# Patient Record
Sex: FEMALE | Race: WHITE
[De-identification: names, ages, dates, MRNs, and addresses within clinical notes are randomized per-mention and may not be internally consistent; named-entity substitution may affect disease eponyms.]

---

## 2017-04-24 ENCOUNTER — HOSPITAL ENCOUNTER (OUTPATIENT)
Dept: HOSPITAL 45 - C.MAMM | Age: 57
Discharge: HOME | End: 2017-04-24
Attending: FAMILY MEDICINE
Payer: COMMERCIAL

## 2017-04-24 DIAGNOSIS — Z12.31: Primary | ICD-10-CM

## 2017-04-24 NOTE — MAMMOGRAPHY REPORT
BILATERAL DIGITAL SCREENING MAMMOGRAM TOMOSYNTHESIS WITH CAD: 4/24/2017

CLINICAL HISTORY: Routine screening.  Patient has no complaints.  





TECHNIQUE:  Breast tomosynthesis in addition to standard 2D mammography was performed. Current study
 was also evaluated with a Computer Aided Detection (CAD) system.  



COMPARISON: Comparison is made to exams dated:  12/17/2015 mammogram, 11/7/2014 mammogram, 7/9/2013 
mammogram, 6/9/2012 mammogram, 4/2/2011 mammogram, and 1/30/2010 mammogram.   



BREAST COMPOSITION:  The tissue of both breasts is extremely dense, which lowers the sensitivity of 
mammography.  



FINDINGS:  The parenchymal pattern is unchanged. No developing mass, architectural distortion or clu
ster of suspicious microcalcifications is seen in either breast.  





IMPRESSION:  ACR BI-RADS CATEGORY 2: BENIGN

There is no mammographic evidence of malignancy. A 1 year screening mammogram is recommended.  The p
atient will receive written notification of the results.  





Approximately 10% of breast cancers are not detected with mammography. A negative mammographic repor
t should not delay biopsy if a clinically suggestive mass is present.



Aria Gerardo M.D.          

ay/:4/24/2017 16:18:42  



Imaging Technologist: Gisell LI)([id number]), [facility] [address]

letter sent: Normal 1/2  

BI-RADS Code: ACR BI-RADS Category 2: Benign

## 2018-01-16 ENCOUNTER — HOSPITAL ENCOUNTER (OUTPATIENT)
Dept: HOSPITAL 45 - C.LAB | Age: 58
Discharge: HOME | End: 2018-01-16
Attending: NURSE PRACTITIONER
Payer: COMMERCIAL

## 2018-01-16 DIAGNOSIS — Z00.00: Primary | ICD-10-CM

## 2018-01-16 LAB
ALBUMIN SERPL-MCNC: 4.3 GM/DL (ref 3.4–5)
ALP SERPL-CCNC: 74 U/L (ref 45–117)
ALT SERPL-CCNC: 25 U/L (ref 12–78)
AST SERPL-CCNC: 23 U/L (ref 15–37)
BASOPHILS # BLD: 0.04 K/UL (ref 0–0.2)
BASOPHILS NFR BLD: 0.9 %
BUN SERPL-MCNC: 12 MG/DL (ref 7–18)
CALCIUM SERPL-MCNC: 9.3 MG/DL (ref 8.5–10.1)
CO2 SERPL-SCNC: 31 MMOL/L (ref 21–32)
CREAT SERPL-MCNC: 0.61 MG/DL (ref 0.6–1.2)
EOS ABS #: 0.26 K/UL (ref 0–0.5)
EOSINOPHIL NFR BLD AUTO: 225 K/UL (ref 130–400)
GLUCOSE SERPL-MCNC: 88 MG/DL (ref 70–99)
HCT VFR BLD CALC: 40.9 % (ref 37–47)
HGB BLD-MCNC: 13.9 G/DL (ref 12–16)
IG#: 0.01 K/UL (ref 0–0.02)
IMM GRANULOCYTES NFR BLD AUTO: 33.5 %
KETONES UR QL STRIP: 84 MG/DL
LYMPHOCYTES # BLD: 1.56 K/UL (ref 1.2–3.4)
MCH RBC QN AUTO: 31.8 PG (ref 25–34)
MCHC RBC AUTO-ENTMCNC: 34 G/DL (ref 32–36)
MCV RBC AUTO: 93.6 FL (ref 80–100)
MONO ABS #: 0.4 K/UL (ref 0.11–0.59)
MONOCYTES NFR BLD: 8.6 %
NEUT ABS #: 2.39 K/UL (ref 1.4–6.5)
NEUTROPHILS # BLD AUTO: 5.6 %
NEUTROPHILS NFR BLD AUTO: 51.2 %
PH UR: 244 MG/DL (ref 0–200)
PMV BLD AUTO: 9.8 FL (ref 7.4–10.4)
POTASSIUM SERPL-SCNC: 4.2 MMOL/L (ref 3.5–5.1)
PROT SERPL-MCNC: 7.2 GM/DL (ref 6.4–8.2)
RED CELL DISTRIBUTION WIDTH CV: 12.5 % (ref 11.5–14.5)
RED CELL DISTRIBUTION WIDTH SD: 42.5 FL (ref 36.4–46.3)
SODIUM SERPL-SCNC: 137 MMOL/L (ref 136–145)
WBC # BLD AUTO: 4.66 K/UL (ref 4.8–10.8)

## 2018-02-28 ENCOUNTER — HOSPITAL ENCOUNTER (OUTPATIENT)
Dept: HOSPITAL 45 - C.PATHSPEC | Age: 58
Discharge: HOME | End: 2018-02-28
Attending: DERMATOLOGY
Payer: COMMERCIAL

## 2018-02-28 DIAGNOSIS — L57.0: Primary | ICD-10-CM
